# Patient Record
Sex: MALE | Race: OTHER | NOT HISPANIC OR LATINO | URBAN - METROPOLITAN AREA
[De-identification: names, ages, dates, MRNs, and addresses within clinical notes are randomized per-mention and may not be internally consistent; named-entity substitution may affect disease eponyms.]

---

## 2017-03-08 ENCOUNTER — EMERGENCY (EMERGENCY)
Facility: HOSPITAL | Age: 22
LOS: 1 days | Discharge: HOME | End: 2017-03-08
Attending: EMERGENCY MEDICINE | Admitting: EMERGENCY MEDICINE
Payer: COMMERCIAL

## 2017-03-08 VITALS
DIASTOLIC BLOOD PRESSURE: 82 MMHG | HEART RATE: 103 BPM | SYSTOLIC BLOOD PRESSURE: 148 MMHG | TEMPERATURE: 99 F | RESPIRATION RATE: 18 BRPM | OXYGEN SATURATION: 96 %

## 2017-03-08 VITALS
DIASTOLIC BLOOD PRESSURE: 67 MMHG | RESPIRATION RATE: 17 BRPM | OXYGEN SATURATION: 98 % | HEART RATE: 120 BPM | SYSTOLIC BLOOD PRESSURE: 110 MMHG | HEIGHT: 72.05 IN | TEMPERATURE: 100 F

## 2017-03-08 DIAGNOSIS — L05.01 PILONIDAL CYST WITH ABSCESS: ICD-10-CM

## 2017-03-08 LAB
ALBUMIN SERPL ELPH-MCNC: 4 G/DL — SIGNIFICANT CHANGE UP (ref 3.4–5)
ALP SERPL-CCNC: 82 U/L — SIGNIFICANT CHANGE UP (ref 40–120)
ALT FLD-CCNC: 37 U/L — SIGNIFICANT CHANGE UP (ref 12–42)
ANION GAP SERPL CALC-SCNC: 8 MMOL/L — LOW (ref 9–16)
AST SERPL-CCNC: 9 U/L — LOW (ref 15–37)
BASOPHILS NFR BLD AUTO: 0.2 % — SIGNIFICANT CHANGE UP (ref 0–2)
BILIRUB SERPL-MCNC: 0.5 MG/DL — SIGNIFICANT CHANGE UP (ref 0.2–1.2)
BUN SERPL-MCNC: 10 MG/DL — SIGNIFICANT CHANGE UP (ref 7–23)
CALCIUM SERPL-MCNC: 9.4 MG/DL — SIGNIFICANT CHANGE UP (ref 8.5–10.5)
CHLORIDE SERPL-SCNC: 102 MMOL/L — SIGNIFICANT CHANGE UP (ref 96–108)
CO2 SERPL-SCNC: 27 MMOL/L — SIGNIFICANT CHANGE UP (ref 22–31)
CREAT SERPL-MCNC: 0.7 MG/DL — SIGNIFICANT CHANGE UP (ref 0.5–1.3)
EOSINOPHIL NFR BLD AUTO: 0.6 % — SIGNIFICANT CHANGE UP (ref 0–6)
GLUCOSE SERPL-MCNC: 90 MG/DL — SIGNIFICANT CHANGE UP (ref 70–99)
HCT VFR BLD CALC: 41.4 % — SIGNIFICANT CHANGE UP (ref 39–50)
HGB BLD-MCNC: 13.9 G/DL — SIGNIFICANT CHANGE UP (ref 13–17)
LACTATE SERPL-SCNC: 1.9 MMOL/L — SIGNIFICANT CHANGE UP (ref 0.5–2)
LYMPHOCYTES # BLD AUTO: 15.8 % — SIGNIFICANT CHANGE UP (ref 13–44)
MCHC RBC-ENTMCNC: 27.5 PG — SIGNIFICANT CHANGE UP (ref 27–34)
MCHC RBC-ENTMCNC: 33.6 G/DL — SIGNIFICANT CHANGE UP (ref 32–36)
MCV RBC AUTO: 81.8 FL — SIGNIFICANT CHANGE UP (ref 80–100)
MONOCYTES NFR BLD AUTO: 7.9 % — SIGNIFICANT CHANGE UP (ref 2–14)
NEUTROPHILS NFR BLD AUTO: 75.5 % — SIGNIFICANT CHANGE UP (ref 43–77)
PLATELET # BLD AUTO: 256 K/UL — SIGNIFICANT CHANGE UP (ref 150–400)
POTASSIUM SERPL-MCNC: 4 MMOL/L — SIGNIFICANT CHANGE UP (ref 3.5–5.3)
POTASSIUM SERPL-SCNC: 4 MMOL/L — SIGNIFICANT CHANGE UP (ref 3.5–5.3)
PROT SERPL-MCNC: 8.2 G/DL — SIGNIFICANT CHANGE UP (ref 6.4–8.2)
RBC # BLD: 5.06 M/UL — SIGNIFICANT CHANGE UP (ref 4.2–5.8)
RBC # FLD: 13.7 % — SIGNIFICANT CHANGE UP (ref 10.3–16.9)
SODIUM SERPL-SCNC: 137 MMOL/L — SIGNIFICANT CHANGE UP (ref 135–145)
WBC # BLD: 15 K/UL — HIGH (ref 3.8–10.5)
WBC # FLD AUTO: 15 K/UL — HIGH (ref 3.8–10.5)

## 2017-03-08 PROCEDURE — 96374 THER/PROPH/DIAG INJ IV PUSH: CPT

## 2017-03-08 PROCEDURE — 87184 SC STD DISK METHOD PER PLATE: CPT

## 2017-03-08 PROCEDURE — 87040 BLOOD CULTURE FOR BACTERIA: CPT

## 2017-03-08 PROCEDURE — 99284 EMERGENCY DEPT VISIT MOD MDM: CPT

## 2017-03-08 PROCEDURE — 36415 COLL VENOUS BLD VENIPUNCTURE: CPT

## 2017-03-08 PROCEDURE — 99284 EMERGENCY DEPT VISIT MOD MDM: CPT | Mod: 25

## 2017-03-08 PROCEDURE — 87070 CULTURE OTHR SPECIMN AEROBIC: CPT

## 2017-03-08 PROCEDURE — 83605 ASSAY OF LACTIC ACID: CPT

## 2017-03-08 PROCEDURE — 80053 COMPREHEN METABOLIC PANEL: CPT

## 2017-03-08 PROCEDURE — 85025 COMPLETE CBC W/AUTO DIFF WBC: CPT

## 2017-03-08 RX ORDER — SODIUM CHLORIDE 9 MG/ML
1000 INJECTION INTRAMUSCULAR; INTRAVENOUS; SUBCUTANEOUS ONCE
Qty: 0 | Refills: 0 | Status: COMPLETED | OUTPATIENT
Start: 2017-03-08 | End: 2017-03-08

## 2017-03-08 RX ORDER — ACETAMINOPHEN 500 MG
650 TABLET ORAL ONCE
Qty: 0 | Refills: 0 | Status: COMPLETED | OUTPATIENT
Start: 2017-03-08 | End: 2017-03-08

## 2017-03-08 RX ORDER — CEPHALEXIN 500 MG
500 CAPSULE ORAL
Qty: 0 | Refills: 0 | Status: DISCONTINUED | OUTPATIENT
Start: 2017-03-08 | End: 2017-03-08

## 2017-03-08 RX ADMIN — SODIUM CHLORIDE 2000 MILLILITER(S): 9 INJECTION INTRAMUSCULAR; INTRAVENOUS; SUBCUTANEOUS at 17:32

## 2017-03-08 RX ADMIN — Medication 100 MILLIGRAM(S): at 17:32

## 2017-03-08 RX ADMIN — Medication 650 MILLIGRAM(S): at 17:20

## 2017-03-08 RX ADMIN — SODIUM CHLORIDE 2000 MILLILITER(S): 9 INJECTION INTRAMUSCULAR; INTRAVENOUS; SUBCUTANEOUS at 18:38

## 2017-03-08 NOTE — ED PROVIDER NOTE - OBJECTIVE STATEMENT
AR 20 yo otherwise healthy m presents to ed for 3 days of redness,swelling, pain to buttock area.  Pt otherwise denies constipation, nausea, vomiting, diarrhea, fevers, chills, weakness, fatigue

## 2017-03-08 NOTE — ED PROVIDER NOTE - MEDICAL DECISION MAKING DETAILS
AR 22 yo otherwise healthy m presents to ed for 3 days of redness, swelling, pain to buttock area. Pt with pilonidal abscess.  Comprehensive labs sent, pt given tylenol, clinda, given IVF's.  I and d performed, pt tolerated procedure well.

## 2017-03-08 NOTE — ED PROVIDER NOTE - ATTENDING CONTRIBUTION TO CARE
pt seen and examined by me, agree with above.  22 yo healthy male with pain, redness in buttock cleft area for a few days, sent by pmd.  on exam very tender, ?flucuance, redness in buttock cleft.  I and D done by JESUS garcia with pus drainage.  antibiotics and fluids given, normal lactate.  dc on clinda to fu with pmd in 2 days

## 2017-03-08 NOTE — ED ADULT NURSE NOTE - OBJECTIVE STATEMENT
Patient complains of abscess to sacrum. Small abscess to sacrum. Patient reports taking Tylenol for pain. Do drainage. Will continue to monitor patient.

## 2017-03-08 NOTE — ED PROVIDER NOTE - SKIN, MLM
Skin normal color for race, warm, dry and intact. No evidence of rash, 2cm x 2cm area of erythema fluctuance below pilonidal area, no rectal involvement

## 2017-03-09 LAB — GRAM STN FLD: SIGNIFICANT CHANGE UP

## 2017-03-12 LAB
-  CEFTRIAXONE: SIGNIFICANT CHANGE UP
-  CLINDAMYCIN: SIGNIFICANT CHANGE UP
-  ERYTHROMYCIN: SIGNIFICANT CHANGE UP
-  PENICILLIN: SIGNIFICANT CHANGE UP
-  VANCOMYCIN: SIGNIFICANT CHANGE UP
CULTURE RESULTS: SIGNIFICANT CHANGE UP
METHOD TYPE: SIGNIFICANT CHANGE UP
METHOD TYPE: SIGNIFICANT CHANGE UP
ORGANISM # SPEC MICROSCOPIC CNT: SIGNIFICANT CHANGE UP
SPECIMEN SOURCE: SIGNIFICANT CHANGE UP

## 2017-03-13 LAB
CULTURE RESULTS: SIGNIFICANT CHANGE UP
CULTURE RESULTS: SIGNIFICANT CHANGE UP
SPECIMEN SOURCE: SIGNIFICANT CHANGE UP
SPECIMEN SOURCE: SIGNIFICANT CHANGE UP

## 2022-10-14 NOTE — ED PROVIDER NOTE - CPE EDP EYES NORM
Per OV note yesterday:    (I48.91) Atrial fibrillation, unspecified type (H)  Comment:   Plan: pt on warfarin. Has upcoming injection by the pain clinic and was given his warfarin instructions to hold x 5d, no bridging necessary.     Home care called today with INR results, [facility] will relay these hold instructions to home care. See dosing note 10/14/22.    normal...